# Patient Record
Sex: FEMALE | Race: WHITE | URBAN - NONMETROPOLITAN AREA
[De-identification: names, ages, dates, MRNs, and addresses within clinical notes are randomized per-mention and may not be internally consistent; named-entity substitution may affect disease eponyms.]

---

## 2020-06-26 ENCOUNTER — IMPORTED ENCOUNTER (OUTPATIENT)
Dept: URBAN - NONMETROPOLITAN AREA CLINIC 1 | Facility: CLINIC | Age: 70
End: 2020-06-26

## 2020-06-26 PROBLEM — H52.4: Noted: 2020-06-26

## 2020-06-26 PROBLEM — H52.223: Noted: 2020-06-26

## 2020-06-26 PROBLEM — H25.13: Noted: 2020-06-26

## 2020-06-26 PROBLEM — H40.023: Noted: 2020-06-26

## 2020-06-26 PROBLEM — E11.9: Noted: 2020-06-26

## 2020-06-26 PROBLEM — H43.813: Noted: 2021-02-11

## 2020-06-26 PROBLEM — H52.12: Noted: 2020-06-26

## 2020-06-26 PROCEDURE — 92004 COMPRE OPH EXAM NEW PT 1/>: CPT

## 2020-06-26 PROCEDURE — 92015 DETERMINE REFRACTIVE STATE: CPT

## 2020-06-26 NOTE — PATIENT DISCUSSION
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

## 2020-10-05 ENCOUNTER — IMPORTED ENCOUNTER (OUTPATIENT)
Dept: URBAN - NONMETROPOLITAN AREA CLINIC 1 | Facility: CLINIC | Age: 70
End: 2020-10-05

## 2020-10-05 PROCEDURE — 99213 OFFICE O/P EST LOW 20 MIN: CPT

## 2020-10-05 PROCEDURE — 76514 ECHO EXAM OF EYE THICKNESS: CPT

## 2020-10-05 PROCEDURE — 92133 CPTRZD OPH DX IMG PST SGM ON: CPT

## 2020-10-05 NOTE — PATIENT DISCUSSION
Glaucoma Suspect -  discussed findings w/patient-  IOP 17 18-  c/d asymmetry 0.75/0.75 0.8/0.8-  LONH superior/inferior thinning noted OU -  OCT ON done 10/5/2020:    OD: 76um 8/10 SS mild thinning superior/nasal normal RNFL temporal/inferiorly    OS: 84um 8/10 SS normal RNFL all quadrants-  Pach Cano 10/5/2020    OD: 541    OS: 542-  need to get 24-2 VF baseline-  RTC 4-6 weeks 24-2 VF and f/u; 's Notes: MR 6/26/2020DFE 6/26/2020Pach Jerome 10/5/2020OCT ON 10/5/308419-7 VF

## 2020-11-05 ENCOUNTER — IMPORTED ENCOUNTER (OUTPATIENT)
Dept: URBAN - NONMETROPOLITAN AREA CLINIC 1 | Facility: CLINIC | Age: 70
End: 2020-11-05

## 2020-11-05 PROCEDURE — 92083 EXTENDED VISUAL FIELD XM: CPT

## 2020-11-05 PROCEDURE — 99213 OFFICE O/P EST LOW 20 MIN: CPT

## 2020-11-05 NOTE — PATIENT DISCUSSION
Glaucoma Suspect -  discussed findings w/patient-  IOP noted at 18 OU stable -  C/D asymmetry 0.75/0.75 0.8/0.8-  LONH superior/inferior thinning noted OU -  OCT ON done 10/5/2020:    OD: 76um 8/10 SS mild thinning superior/nasal normal RNFL temporal/inferiorly    OS: 84um 8/10 SS normal RNFL all quadrants-  Pach Cano 10/5/2020    OD: 541    OS: 542-  Baseline VF 24-2 done 11/5/2020     OD: UR inferior nasal step scattered scotoma will need to repeat     OS:  UR scattered scotoma non-specific defects-  Continue to monitor closely for changesCombined Cataracts OU-  Discussed diagnosis w/ pt today-  Signs/symptoms associated discussed-  Now visually significant with VA complaint noted by patient-  Recommend referral to Dr. Clement perez for revaluation pt agreed with plan-  Monitor per his recommendation(s)Type II DM w/o Retinopathy OU -  discussed findings w/patient-  condition controlled with medication -  no retinopathy noted OU-  discussed the importance of good blood sugar control and the negative effects of poorly controlled sugars on ocular health -  will need to send PCP notes at next visit-  monitor yearly or prn PVD OU:  -  Discussed findings of exam in detail with the patient. -  The risk of retinal detachment in patients with PVDs was discussed with the patient and the warning signs of retinal detachment were carefully reviewed with the patient. -  The patient was warned to return to the office or contact the ophthalmologist on call immediately if they experience signs of retinal detachment or changes in vision noted from today.  -  Continue to monitor; 's Notes:  6/26/2020DFE 6/26/2020Pach Jerome 10/5/2020OCT ON 10/5/618804-2 VF 11/5/2020

## 2021-02-23 ENCOUNTER — IMPORTED ENCOUNTER (OUTPATIENT)
Dept: URBAN - NONMETROPOLITAN AREA CLINIC 1 | Facility: CLINIC | Age: 71
End: 2021-02-23

## 2021-02-23 PROBLEM — E11.9: Noted: 2021-02-23

## 2021-02-23 PROBLEM — H25.813: Noted: 2021-02-23

## 2021-02-23 PROBLEM — H40.023: Noted: 2021-02-23

## 2021-02-23 PROCEDURE — 92004 COMPRE OPH EXAM NEW PT 1/>: CPT

## 2021-02-23 NOTE — PATIENT DISCUSSION
Cataract(s)-Visually significant cataract OU .-Cataract(s) causing symptomatic impairment of visual function not correctable with a tolerable change in glasses or contact lenses lighting or non-operative means resulting in specific activity limitations and/or participation restrictions including but not limited to reading viewing television driving or meeting vocational or recreational needs. -Expectation is clearer vision and functional improvement in symptoms as well as reduced glare disability after cataract removal.-Order IOLMaster and OPD today. -Recommend Toric IOL   /   Limbal Relaxing Incisions based on today's OPD testing and lifestyle questionnaire.-All questions were answered regarding surgery including pre and post-op medications appointments activity restrictions and anesthetic usage.-The risks benefits and alternatives and special risk factors for the patient were discussed in detail including but not limited to: bleeding infection retinal detachment vitreous loss problems with the implant and possible need for additional surgery.-Although rare the possibility of complete vision loss was discussed.-The possible need for glasses post-operatively was discussed.-Order medical clearance exam based on history of diabetes-Patient elects to proceed with cataract surgery OS . Will schedule at patient's convenience and re-evaluate OD  in the future. Discussed all lens options w/ patient in detail Pt qualifies for Toric based off todays testing explained this lens w/ patient w/ less dependence on glasses for distance but explained to pt need for glasses for readingStart AT and Lotemax TID OU x 1 Week and repeat minerva Pt elects LenSX OU Discussed Stand/Trad w/ patient  and need for glasses for distnace and reading w/ this lens.  Post op inflammation anticipated discussed dextenza insertion after surgery.; 's Notes:  6/26/2020DFOSCAR 6/26/2020Pach Jerome 10/5/2020OCT ON 10/5/993017-6 VF 11/5/2020

## 2021-04-30 PROBLEM — H25.813: Noted: 2021-04-30

## 2021-04-30 PROBLEM — E11.9: Noted: 2021-04-30

## 2021-04-30 PROBLEM — H40.023: Noted: 2021-04-30

## 2021-05-04 ENCOUNTER — IMPORTED ENCOUNTER (OUTPATIENT)
Dept: URBAN - NONMETROPOLITAN AREA CLINIC 1 | Facility: CLINIC | Age: 71
End: 2021-05-04

## 2021-06-22 ENCOUNTER — IMPORTED ENCOUNTER (OUTPATIENT)
Dept: URBAN - NONMETROPOLITAN AREA CLINIC 1 | Facility: CLINIC | Age: 71
End: 2021-06-22

## 2021-06-22 PROCEDURE — 92014 COMPRE OPH EXAM EST PT 1/>: CPT

## 2021-06-22 NOTE — PATIENT DISCUSSION
Cataract(s)-Visually significant cataract OU .-Cataract(s) causing symptomatic impairment of visual function not correctable with a tolerable change in glasses or contact lenses lighting or non-operative means resulting in specific activity limitations and/or participation restrictions including but not limited to reading viewing television driving or meeting vocational or recreational needs. -Expectation is clearer vision and functional improvement in symptoms as well as reduced glare disability after cataract removal.-Order IOLMaster and OPD today. -Recommend Stand/Trad  based on today's OPD testing and lifestyle questionnaire.-All questions were answered regarding surgery including pre and post-op medications appointments activity restrictions and anesthetic usage.-The risks benefits and alternatives and special risk factors for the patient were discussed in detail including but not limited to: bleeding infection retinal detachment vitreous loss problems with the implant and possible need for additional surgery.-Although rare the possibility of complete vision loss was discussed.-The possible need for glasses post-operatively was discussed.-Order medical clearance exam based on history of diabetes-Patient elects to proceed with cataract surgery OS . Will schedule at patient's convenience and re-evaluate OD  in the future. Discussed all lens and patient elects Stand/Trad OU Postop inflammation anticipated discussed dextenza insertion after surgery.; 's Notes: MR 6/26/2020DFE 6/26/2020Pach Jerome 10/5/2020OCT ON 10/5/185972-1 VF 11/5/2020

## 2021-08-31 ENCOUNTER — IMPORTED ENCOUNTER (OUTPATIENT)
Dept: URBAN - NONMETROPOLITAN AREA CLINIC 1 | Facility: CLINIC | Age: 71
End: 2021-08-31

## 2021-08-31 PROBLEM — E11.9: Noted: 2021-08-31

## 2021-08-31 PROBLEM — H25.811: Noted: 2021-08-31

## 2021-08-31 PROBLEM — H40.023: Noted: 2021-08-31

## 2021-08-31 PROCEDURE — 99024 POSTOP FOLLOW-UP VISIT: CPT

## 2021-08-31 NOTE — PATIENT DISCUSSION
s/p PC IOL OS Stand/Trad 8/30/2021-  discussed findings w/patient-  Pt doing well s/p PCIOL. -  Continue post-op gtts according to instruction sheet and sleep with eye shield over eye for 7 nights. -  Avoid bending at the waist lifting anything over 5lbs and dirty or reymundo environments.-  RTC as scheduled or prn; 's Notes: MR 6/26/2020DFE 6/26/2020Pach Cano 10/5/2020OCT ON 10/5/137864-1 VF 11/5/2020

## 2021-09-09 ENCOUNTER — IMPORTED ENCOUNTER (OUTPATIENT)
Dept: URBAN - NONMETROPOLITAN AREA CLINIC 1 | Facility: CLINIC | Age: 71
End: 2021-09-09

## 2021-09-09 PROBLEM — E11.9: Noted: 2021-08-31

## 2021-09-09 PROBLEM — H40.023: Noted: 2021-08-31

## 2021-09-09 PROBLEM — Z98.42: Noted: 2021-09-09

## 2021-09-09 PROBLEM — Z01.818: Noted: 2021-09-09

## 2021-09-09 PROBLEM — H25.811: Noted: 2021-08-31

## 2021-09-09 NOTE — PATIENT DISCUSSION
Medical Clearance-Medical clearance done today. -No outstanding concerns that would preclude surgery.-Patient is cleared to proceed with scheduled surgery.; 's Notes:  6/26/2020DFE 6/26/2020Pach Jerome 10/5/2020OCT ON 10/5/679654-2 VF 11/5/2020

## 2021-09-09 NOTE — PATIENT DISCUSSION
Cataract(s)-Visually significant cataract OD . -Cataract(s) causing symptomatic impairment of visual function not correctable with a tolerable change in glasses or contact lenses lighting or non-operative means resulting in specific activity limitations and/or participation restrictions including but not limited to reading viewing television driving or meeting vocational or recreational needs. -Expectation is clearer vision and functional improvement in symptoms as well as reduced glare disability after cataract removal.-Recommend Standard Trad IOL OD based on previous OPD testing and lifestyle questionnaire.-All questions were answered regarding surgery including pre and post-op medications appointments activity restrictions and anesthetic usage.-The risks benefits and alternatives and special risk factors for the patient were discussed in detail including but not limited to: bleeding infection retinal detachment vitreous loss problems with the implant and possible need for additional surgery.-Although rare the possibility of complete vision loss was discussed.-The need for glasses post-operatively was discussed.-Patient elects to proceed with cataract surgery OD . Standard Trad IOL ODs/p PCIOL-Pt doing well at 1 week s/p PCIOL. -Continue post-op gtts according to instruction sheet.-Okay to resume usual activites and d/c eye shield.; 's Notes:  6/26/2020DFE 6/26/2020Pach Jerome 10/5/2020OCT ON 10/5/637416-4 VF 11/5/2020

## 2021-09-23 ENCOUNTER — IMPORTED ENCOUNTER (OUTPATIENT)
Dept: URBAN - NONMETROPOLITAN AREA CLINIC 1 | Facility: CLINIC | Age: 71
End: 2021-09-23

## 2021-09-23 PROBLEM — E11.9: Noted: 2021-09-23

## 2021-09-23 PROBLEM — H40.023: Noted: 2021-09-23

## 2021-09-23 PROBLEM — Z79.4: Noted: 2021-09-23

## 2021-09-23 PROBLEM — Z98.41: Noted: 2021-09-23

## 2021-09-23 PROBLEM — H52.02: Noted: 2022-01-10

## 2021-09-23 PROBLEM — Z96.1: Noted: 2022-01-10

## 2021-09-23 PROBLEM — Z98.42: Noted: 2021-09-23

## 2021-09-23 PROBLEM — H52.223: Noted: 2022-01-10

## 2021-09-23 PROBLEM — H52.4: Noted: 2022-01-10

## 2021-09-23 PROBLEM — H26.493: Noted: 2022-01-10

## 2021-09-23 PROCEDURE — 0356T INSERTION OF DRUG-ELUTING IMPLANT (INCLUDING PUNCTAL DILATION AND IMPLANT REMOVAL WHEN PERFORMED) INTO LACRIMAL CANALICULUS, EACH: CPT

## 2021-09-23 PROCEDURE — 92136 OPHTHALMIC BIOMETRY: CPT

## 2021-09-23 PROCEDURE — 66984 XCAPSL CTRC RMVL W/O ECP: CPT

## 2021-09-23 PROCEDURE — 99024 POSTOP FOLLOW-UP VISIT: CPT

## 2021-09-23 PROCEDURE — 66982 XCAPSL CTRC RMVL CPLX WO ECP: CPT

## 2021-09-23 NOTE — PATIENT DISCUSSION
s/p PC IOL OD Stand/Trad 9/23/2021-  discussed findings w/patient-  Pt doing well s/p PCIOL. -  Continue post-op gtts according to instruction sheet and sleep with eye shield over eye for 7 nights. -  Avoid bending at the waist lifting anything over 5lbs and dirty or reymundo environments.-  RTC as scheduled or prn; 's Notes: MR 6/26/2020DFE 6/26/2020Pach Cano 10/5/2020OCT ON 10/5/732533-5 VF 11/5/2020

## 2021-09-30 ENCOUNTER — IMPORTED ENCOUNTER (OUTPATIENT)
Dept: URBAN - NONMETROPOLITAN AREA CLINIC 1 | Facility: CLINIC | Age: 71
End: 2021-09-30

## 2021-09-30 PROCEDURE — 99024 POSTOP FOLLOW-UP VISIT: CPT

## 2021-09-30 NOTE — PATIENT DISCUSSION
s/p PC IOL OD Stand/Trad 9/23/2021-  discussed findings w/patient-  Pt doing well at 1 week s/p PCIOL. -  Continue post-op gtts according to instruction sheet.-  Okay to resume usual activites and d/c eye shield. -  RTC 3 mo DFE (patient defers MR); 's Notes: MR 6/26/2020DFE 6/26/2020Pach Cano 10/5/2020OCT ON 10/5/164643-3 VF 11/5/2020

## 2022-01-10 ENCOUNTER — IMPORTED ENCOUNTER (OUTPATIENT)
Dept: URBAN - NONMETROPOLITAN AREA CLINIC 1 | Facility: CLINIC | Age: 72
End: 2022-01-10

## 2022-01-10 PROBLEM — E11.9: Noted: 2021-09-23

## 2022-01-10 PROBLEM — Z79.4: Noted: 2021-09-23

## 2022-01-10 PROBLEM — Z96.1: Noted: 2022-01-10

## 2022-01-10 PROCEDURE — 92015 DETERMINE REFRACTIVE STATE: CPT

## 2022-01-10 PROCEDURE — 99213 OFFICE O/P EST LOW 20 MIN: CPT

## 2022-01-10 NOTE — PATIENT DISCUSSION
Pseudophakia w/PCO OU-  discussed findings w/patient-  patient is doing well at this time-  mild PCO OU-  continue to monitor yearly or prnDES OU-  discussed findings w/patient-  mild symptoms at this time-  start Refresh Relieva at least BID OU-  continue to monitor yearly or prnType 2 DM w/o Retinopathy OU-  discussed findings w/patient-  Stressed the importance of keeping blood sugars under control blood pressure under control and weight normalization and regular visits with PCP.-  Explained the possible effects of poorly controlled diabetes and the damage that diabetes can cause to ocular health. -  will send notes to [place] PA-  Pt instructed to contact our office with any vision changes. -  Continue to monitor yearly or prnPVD OU:  -  Discussed findings of exam in detail with the patient. -  The risk of retinal detachment in patients with PVDs was discussed with the patient and the warning signs of retinal detachment were carefully reviewed with the patient. -  The patient was warned to return to the office or contact the ophthalmologist on call immediately if they experience signs of retinal detachment or changes in vision noted from today.  -  Continue to monitorGlaucoma Suspect -  discussed findings w/patient-  IOP noted at 18 OU stable -  C/D asymmetry 0.75/0.75 0.8/0.8-  LONH superior/inferior thinning noted OU -  OCT ON done 10/5/2020:    OD: 76um 8/10 SS mild thinning superior/nasal normal RNFL temporal/inferiorly    OS: 84um 8/10 SS normal RNFL all quadrants-  Pach Cano 10/5/2020    OD: 541    OS: 542-  Baseline VF 24-2 done 11/5/2020     OD:  ?     OS:  ?-  Continue to monitor closely for changes; 's Notes: MR 6/26/2020DFE 1/10/2022Pach Jerome 10/5/2020OCT ON 10/5/882413-3 VF 11/5/2020

## 2022-04-15 ASSESSMENT — VISUAL ACUITY
OD_SC: 20/30-
OD_PAM: 20/20
OD_CC: 20/20
OU_SC: 20/25
OD_SC: 20/40
OD_CC: 20/40
OD_GLARE: 20/200
OD_GLARE: 20/40
OS_SC: 20/30
OD_PH: 20/40
OD_PAM: 20/20
OS_SC: 20/40
OS_CC: 20/50+2
OS_CC: 20/30
OS_CC: 20/25+
OD_GLARE: 20/30-2
OD_PH: 20/25
OD_SC: 20/30
OS_CC: 20/30
OS_CC: 20/40
OD_CC: 20/70
OS_PH: 20/25
OD_PAM: 20/40
OS_PH: 20/25
OU_CC: 20/20
OS_GLARE: 20/80
OS_SC: 20/30-
OD_CC: 20/30
OD_PH: 20/25
OD_GLARE: 20/200
OS_SC: 20/30
OS_SC: 20/40
OS_GLARE: 20/40
OD_CC: 20/30
OU_SC: 20/30
OD_SC: 20/25
OS_AM: 20/20
OD_SC: 20/30-
OS_CC: 20/20-1
OU_SC: 20/20
OS_AM: 20/40
OU_CC: J1+
OD_CC: 20/50
OS_SC: 20/25
OS_GLARE: 20/50
OD_CC: 20/30

## 2022-04-15 ASSESSMENT — PACHYMETRY
OS_CT_UM: 542; ADJ: THIN
OS_CT_UM: 542; ADJ: THIN
OD_CT_UM: 541; ADJ: THIN
OS_CT_UM: 542; ADJ: THIN
OD_CT_UM: 541; ADJ: THIN
OS_CT_UM: 542; ADJ: THIN
OD_CT_UM: 541; ADJ: THIN
OD_CT_UM: 541; ADJ: THIN
OS_CT_UM: 542; ADJ: THIN
OD_CT_UM: 541; ADJ: THIN

## 2022-04-15 ASSESSMENT — TONOMETRY
OS_IOP_MMHG: 18
OS_IOP_MMHG: 17
OD_IOP_MMHG: 16
OD_IOP_MMHG: 18
OD_IOP_MMHG: 17
OD_IOP_MMHG: 18
OD_IOP_MMHG: 17
OS_IOP_MMHG: 18
OS_IOP_MMHG: 17
OS_IOP_MMHG: 18
OD_IOP_MMHG: 17
OS_IOP_MMHG: 18
OS_IOP_MMHG: 18
OD_IOP_MMHG: 26
OS_IOP_MMHG: 18
OD_IOP_MMHG: 18
OD_IOP_MMHG: 18
OS_IOP_MMHG: 18
OD_IOP_MMHG: 17
OS_IOP_MMHG: 17

## 2022-09-14 ENCOUNTER — CONSULTATION/EVALUATION (OUTPATIENT)
Dept: URBAN - NONMETROPOLITAN AREA CLINIC 4 | Facility: CLINIC | Age: 72
End: 2022-09-14

## 2022-09-14 DIAGNOSIS — H26.493: ICD-10-CM

## 2022-09-14 PROCEDURE — 66821 AFTER CATARACT LASER SURGERY: CPT

## 2022-09-14 PROCEDURE — 92014 COMPRE OPH EXAM EST PT 1/>: CPT

## 2022-09-14 ASSESSMENT — TONOMETRY
OD_IOP_MMHG: 15
OS_IOP_MMHG: 14

## 2022-09-14 ASSESSMENT — VISUAL ACUITY
OD_BAT: 20/30
OD_CC: 20/25+2
OS_CC: 20/25
OS_BAT: 20/40

## 2022-09-14 NOTE — PATIENT DISCUSSION
(Surgical)  Visually Significant secondary to glare; schedule YAG Cap. Pros, cons, risks and benefits discussed with patient. Patient wishes to proceed w/ YAG PC OS 7 spots @ 2.0 mj per spot.

## 2022-09-14 NOTE — PROCEDURE NOTE: CLINICAL
PROCEDURE NOTE: YAG Capsulotomy OS. Diagnosis: Posterior Capsular Opacity. Anesthesia: Topical. The purpose and nature of the procedure, possible alternative methods of treatment, the risks involved and the possibility of complications were discussed with patient. The Patient wishes to proceed and the consent was signed. 1 gtt Prolensa applied. The laser was then performed under topical anesthesia with no complications. Post op instructions were given to patient as well as a follow-up appointment. Patient was advised to call our office if any questions or concerns. Kenyatta Zamora

## 2022-12-07 ENCOUNTER — CONSULTATION/EVALUATION (OUTPATIENT)
Dept: URBAN - NONMETROPOLITAN AREA CLINIC 4 | Facility: CLINIC | Age: 72
End: 2022-12-07

## 2022-12-07 PROCEDURE — 66821 AFTER CATARACT LASER SURGERY: CPT

## 2022-12-07 PROCEDURE — 99214 OFFICE O/P EST MOD 30 MIN: CPT

## 2022-12-07 PROCEDURE — 92134 CPTRZ OPH DX IMG PST SGM RTA: CPT

## 2022-12-07 ASSESSMENT — VISUAL ACUITY
OD_BAT: 20/40
OD_CC: 20/20-1
OS_CC: 20/20

## 2022-12-07 ASSESSMENT — TONOMETRY
OS_IOP_MMHG: 16
OD_IOP_MMHG: 16

## 2022-12-07 NOTE — PATIENT DISCUSSION
Doing well with open capsule noted. OCT MAC done today shows no issues and retina stable. No further treatment needed. Discussed with.

## 2022-12-07 NOTE — PROCEDURE NOTE: CLINICAL
PROCEDURE NOTE: YAG Capsulotomy OD. Diagnosis: Other Secondary Cataract. Anesthesia: Topical. The purpose and nature of the procedure, possible alternative methods of treatment, the risks involved and the possibility of complications were discussed with patient. The Patient wishes to proceed and the consent was signed. 1 gtt Prolensa applied. The laser was then performed under topical anesthesia with no complications. Post op instructions were given to patient as well as a follow-up appointment. Patient was advised to call our office if any questions or concerns. 2.1 mj # 30.

## 2023-09-25 ENCOUNTER — COMPREHENSIVE EXAM (OUTPATIENT)
Dept: URBAN - NONMETROPOLITAN AREA CLINIC 4 | Facility: CLINIC | Age: 73
End: 2023-09-25

## 2023-09-25 DIAGNOSIS — H40.013: ICD-10-CM

## 2023-09-25 DIAGNOSIS — H52.4: ICD-10-CM

## 2023-09-25 DIAGNOSIS — H52.03: ICD-10-CM

## 2023-09-25 DIAGNOSIS — Z79.4: ICD-10-CM

## 2023-09-25 DIAGNOSIS — E11.9: ICD-10-CM

## 2023-09-25 DIAGNOSIS — Z96.1: ICD-10-CM

## 2023-09-25 PROCEDURE — 99214 OFFICE O/P EST MOD 30 MIN: CPT

## 2023-09-25 PROCEDURE — 92133 CPTRZD OPH DX IMG PST SGM ON: CPT

## 2023-09-25 PROCEDURE — 92015 DETERMINE REFRACTIVE STATE: CPT

## 2023-09-25 ASSESSMENT — VISUAL ACUITY
OS_CC: 20/20
OD_CC: 20/20

## 2023-09-25 ASSESSMENT — TONOMETRY
OD_IOP_MMHG: 15
OS_IOP_MMHG: 16

## 2025-08-21 ENCOUNTER — COMPREHENSIVE EXAM (OUTPATIENT)
Age: 75
End: 2025-08-21

## 2025-08-21 DIAGNOSIS — Z96.1: ICD-10-CM

## 2025-08-21 DIAGNOSIS — Z79.4: ICD-10-CM

## 2025-08-21 DIAGNOSIS — H40.013: ICD-10-CM

## 2025-08-21 DIAGNOSIS — H52.03: ICD-10-CM

## 2025-08-21 DIAGNOSIS — E11.9: ICD-10-CM

## 2025-08-21 DIAGNOSIS — H52.4: ICD-10-CM

## 2025-08-21 PROCEDURE — 99214 OFFICE O/P EST MOD 30 MIN: CPT

## 2025-08-21 PROCEDURE — 92133 CPTRZD OPH DX IMG PST SGM ON: CPT

## 2025-08-21 PROCEDURE — 92015 DETERMINE REFRACTIVE STATE: CPT
